# Patient Record
Sex: FEMALE | Race: WHITE | Employment: FULL TIME | URBAN - METROPOLITAN AREA
[De-identification: names, ages, dates, MRNs, and addresses within clinical notes are randomized per-mention and may not be internally consistent; named-entity substitution may affect disease eponyms.]

---

## 2023-07-08 ENCOUNTER — APPOINTMENT (OUTPATIENT)
Dept: GENERAL RADIOLOGY | Age: 28
End: 2023-07-08
Payer: COMMERCIAL

## 2023-07-08 ENCOUNTER — HOSPITAL ENCOUNTER (EMERGENCY)
Age: 28
Discharge: HOME | End: 2023-07-08
Payer: COMMERCIAL

## 2023-07-08 VITALS
OXYGEN SATURATION: 100 % | HEART RATE: 94 BPM | WEIGHT: 195 LBS | RESPIRATION RATE: 16 BRPM | HEIGHT: 67 IN | TEMPERATURE: 97.8 F | DIASTOLIC BLOOD PRESSURE: 94 MMHG | BODY MASS INDEX: 30.61 KG/M2 | SYSTOLIC BLOOD PRESSURE: 145 MMHG

## 2023-07-08 DIAGNOSIS — S93.402A SPRAIN OF LEFT ANKLE, UNSPECIFIED LIGAMENT, INITIAL ENCOUNTER: Primary | ICD-10-CM

## 2023-07-08 PROCEDURE — 73630 X-RAY EXAM OF FOOT: CPT

## 2023-07-08 PROCEDURE — 99283 EMERGENCY DEPT VISIT LOW MDM: CPT

## 2023-07-08 RX ORDER — ETONOGESTREL AND ETHINYL ESTRADIOL 11.7; 2.7 MG/1; MG/1
1 INSERT, EXTENDED RELEASE VAGINAL
COMMUNITY
Start: 2021-02-22

## 2023-07-08 ASSESSMENT — PAIN - FUNCTIONAL ASSESSMENT: PAIN_FUNCTIONAL_ASSESSMENT: 0-10

## 2023-07-08 ASSESSMENT — PAIN SCALES - GENERAL: PAINLEVEL_OUTOF10: 3

## 2023-07-08 NOTE — ED NOTES
Pt has d/c order. D/C instructions given. Pt verbalized understanding. Pt out to lobby.          Aly Tim RN  07/08/23 1926

## 2023-07-10 NOTE — ED PROVIDER NOTES
**ADVANCED PRACTICE PROVIDER, I HAVE EVALUATED THIS [facility] ED  EMERGENCY DEPARTMENT ENCOUNTER      Pt Name: Navdeep Mansfield  WSF:[id number]  [id number] 1995  Date of evaluation: 7/8/2023  Provider: Andrew Agee PA-C  Note Started: 8:26 AM EDT 7/10/2023        Chief Complaint:    Chief Complaint   Patient presents with    Foot Pain     Pt states she was walking and her left ankle rolled and hurt top of left foot, pt states pain to top of left foot throbbing at about 1700         Nursing Notes, Past Medical Hx, Past Surgical Hx, Social Hx, Allergies, and Family Hx were all reviewed and agreed with or any disagreements were addressed in the HPI.    HPI: (Location, Duration, Timing, Severity, Quality, Assoc Sx, Context, Modifying factors)    History From: the patient  Limitations to history : None    Social Determinants Significantly Affecting Health : None    Chief Complaint of foot pain    This is a  34 y.o. female who presents to the emergency department stating that she has pain to the left lateral ankle and foot. She was walking, rolled her ankle but states her pain is more at the top of the foot. Happened about 5:00 to several hours ago. Pain is worse when she walks and better with rest.  Rates painlessly secondary she does believe some swelling to the area. PastMedical/Surgical History:  History reviewed. No pertinent past medical history. History reviewed. No pertinent surgical history. Medications:  Discharge Medication List as of 7/8/2023  7:21 PM        CONTINUE these medications which have NOT CHANGED    Details   etonogestrel-ethinyl estradiol (NUVARING) 0.12-0.015 MG/24HR vaginal ring Place 1 each vaginally every 21 daysHistorical Med             Review of Systems:  (1 systems needed)  Review of Systems    \"Positives and Pertinent negatives as per HPI\"    Physical Exam:  Physical Exam  Vitals and nursing note reviewed.    Constitutional:       Appearance: